# Patient Record
Sex: FEMALE | Race: WHITE | NOT HISPANIC OR LATINO | URBAN - NONMETROPOLITAN AREA
[De-identification: names, ages, dates, MRNs, and addresses within clinical notes are randomized per-mention and may not be internally consistent; named-entity substitution may affect disease eponyms.]

---

## 2019-03-21 ENCOUNTER — IMPORTED ENCOUNTER (OUTPATIENT)
Dept: URBAN - NONMETROPOLITAN AREA CLINIC 1 | Facility: CLINIC | Age: 84
End: 2019-03-21

## 2019-03-21 PROBLEM — H35.3131: Noted: 2019-03-21

## 2019-03-21 PROBLEM — Z96.1: Noted: 2019-03-21

## 2019-03-21 PROBLEM — H40.11X1: Noted: 2019-03-21

## 2019-03-21 PROCEDURE — S0620 ROUTINE OPHTHALMOLOGICAL EXA: HCPCS

## 2019-03-21 NOTE — PATIENT DISCUSSION
*PRIMARY OPEN ANGLE GLAUCOMA:.-  Discussed findings of exam in detail with the patient. -  discussed the chronic progressive nature of this disease and various treatment options. -  importance of good compliance with medications was emphasized. stable w/ timolol qam ouokay yo refill due to alzhem. pseudophakia oumonitorMAP-DOT CORNEAL DYSTROPHY OU:-  Discussed findings of exam in detail with the patient. -  discussed the risk of corneal edema with vision loss and the importance of monitoring this chronic disease. *AMD:. DRY WITH RPE CHANGES-  Discussed findings of exam in detail with the patient. -  discussed the chronic nature of this disease and limited treatment options. -  recommended no smoking.

## 2020-09-21 NOTE — PATIENT DISCUSSION
Discussed risk of myopic degeneration and retinal tear or detachment along with warning signs and consequences.

## 2022-04-09 ASSESSMENT — TONOMETRY
OD_IOP_MMHG: 16
OS_IOP_MMHG: 15

## 2022-04-09 ASSESSMENT — VISUAL ACUITY
OD_CC: J1
OS_CC: J1
OS_SC: 20/20
OD_SC: 20/20